# Patient Record
Sex: FEMALE | Race: BLACK OR AFRICAN AMERICAN | Employment: UNEMPLOYED | URBAN - METROPOLITAN AREA
[De-identification: names, ages, dates, MRNs, and addresses within clinical notes are randomized per-mention and may not be internally consistent; named-entity substitution may affect disease eponyms.]

---

## 2023-10-21 ENCOUNTER — HOSPITAL ENCOUNTER (EMERGENCY)
Age: 53
Discharge: HOME | End: 2023-10-21
Attending: STUDENT IN AN ORGANIZED HEALTH CARE EDUCATION/TRAINING PROGRAM
Payer: COMMERCIAL

## 2023-10-21 VITALS
OXYGEN SATURATION: 100 % | HEART RATE: 73 BPM | SYSTOLIC BLOOD PRESSURE: 108 MMHG | RESPIRATION RATE: 16 BRPM | DIASTOLIC BLOOD PRESSURE: 78 MMHG | WEIGHT: 220 LBS | BODY MASS INDEX: 33.34 KG/M2 | TEMPERATURE: 98 F | HEIGHT: 68 IN

## 2023-10-21 DIAGNOSIS — R11.0 NAUSEA: ICD-10-CM

## 2023-10-21 DIAGNOSIS — M54.41 ACUTE BILATERAL LOW BACK PAIN WITH RIGHT-SIDED SCIATICA: Primary | ICD-10-CM

## 2023-10-21 LAB
ALBUMIN SERPL-MCNC: 4 GM/DL (ref 3.4–5)
ALP BLD-CCNC: 90 IU/L (ref 40–129)
ALT SERPL-CCNC: 33 U/L (ref 10–40)
ANION GAP SERPL CALCULATED.3IONS-SCNC: 7 MMOL/L (ref 4–16)
AST SERPL-CCNC: 30 IU/L (ref 15–37)
BASOPHILS ABSOLUTE: 0 K/CU MM
BASOPHILS RELATIVE PERCENT: 0.5 % (ref 0–1)
BILIRUB SERPL-MCNC: 0.3 MG/DL (ref 0–1)
BUN SERPL-MCNC: 16 MG/DL (ref 6–23)
CALCIUM SERPL-MCNC: 9.2 MG/DL (ref 8.3–10.6)
CHLORIDE BLD-SCNC: 104 MMOL/L (ref 99–110)
CO2: 28 MMOL/L (ref 21–32)
CREAT SERPL-MCNC: 0.8 MG/DL (ref 0.6–1.1)
DIFFERENTIAL TYPE: ABNORMAL
EOSINOPHILS ABSOLUTE: 0.2 K/CU MM
EOSINOPHILS RELATIVE PERCENT: 4.7 % (ref 0–3)
GFR SERPL CREATININE-BSD FRML MDRD: >60 ML/MIN/1.73M2
GLUCOSE SERPL-MCNC: 92 MG/DL (ref 70–99)
HCT VFR BLD CALC: 38.2 % (ref 37–47)
HEMOGLOBIN: 11.7 GM/DL (ref 12.5–16)
IMMATURE NEUTROPHIL %: 0.2 % (ref 0–0.43)
LIPASE: 29 IU/L (ref 13–60)
LYMPHOCYTES ABSOLUTE: 1.4 K/CU MM
LYMPHOCYTES RELATIVE PERCENT: 33.3 % (ref 24–44)
MCH RBC QN AUTO: 26.8 PG (ref 27–31)
MCHC RBC AUTO-ENTMCNC: 30.6 % (ref 32–36)
MCV RBC AUTO: 87.6 FL (ref 78–100)
MONOCYTES ABSOLUTE: 0.5 K/CU MM
MONOCYTES RELATIVE PERCENT: 12.6 % (ref 0–4)
NUCLEATED RBC %: 0 %
PDW BLD-RTO: 15.4 % (ref 11.7–14.9)
PLATELET # BLD: 134 K/CU MM (ref 140–440)
POTASSIUM SERPL-SCNC: 4.3 MMOL/L (ref 3.5–5.1)
RBC # BLD: 4.36 M/CU MM (ref 4.2–5.4)
SEGMENTED NEUTROPHILS ABSOLUTE COUNT: 2.1 K/CU MM
SEGMENTED NEUTROPHILS RELATIVE PERCENT: 48.7 % (ref 36–66)
SODIUM BLD-SCNC: 139 MMOL/L (ref 135–145)
TOTAL IMMATURE NEUTOROPHIL: 0.01 K/CU MM
TOTAL NUCLEATED RBC: 0 K/CU MM
TOTAL PROTEIN: 6.7 GM/DL (ref 6.4–8.2)
WBC # BLD: 4.3 K/CU MM (ref 4–10.5)

## 2023-10-21 PROCEDURE — 83690 ASSAY OF LIPASE: CPT

## 2023-10-21 PROCEDURE — 80053 COMPREHEN METABOLIC PANEL: CPT

## 2023-10-21 PROCEDURE — 99284 EMERGENCY DEPT VISIT MOD MDM: CPT

## 2023-10-21 PROCEDURE — 6360000002 HC RX W HCPCS: Performed by: STUDENT IN AN ORGANIZED HEALTH CARE EDUCATION/TRAINING PROGRAM

## 2023-10-21 PROCEDURE — 85025 COMPLETE CBC W/AUTO DIFF WBC: CPT

## 2023-10-21 PROCEDURE — 96372 THER/PROPH/DIAG INJ SC/IM: CPT

## 2023-10-21 PROCEDURE — 6370000000 HC RX 637 (ALT 250 FOR IP): Performed by: STUDENT IN AN ORGANIZED HEALTH CARE EDUCATION/TRAINING PROGRAM

## 2023-10-21 RX ORDER — LIDOCAINE 4 G/G
1 PATCH TOPICAL DAILY
Qty: 15 EACH | Refills: 0 | Status: SHIPPED | OUTPATIENT
Start: 2023-10-21 | End: 2023-11-05

## 2023-10-21 RX ORDER — ACETAMINOPHEN 500 MG
1000 TABLET ORAL
Status: COMPLETED | OUTPATIENT
Start: 2023-10-21 | End: 2023-10-21

## 2023-10-21 RX ORDER — LIDOCAINE 4 G/G
1 PATCH TOPICAL DAILY
Status: DISCONTINUED | OUTPATIENT
Start: 2023-10-21 | End: 2023-10-21 | Stop reason: HOSPADM

## 2023-10-21 RX ORDER — KETOROLAC TROMETHAMINE 15 MG/ML
15 INJECTION, SOLUTION INTRAMUSCULAR; INTRAVENOUS ONCE
Status: COMPLETED | OUTPATIENT
Start: 2023-10-21 | End: 2023-10-21

## 2023-10-21 RX ORDER — IBUPROFEN 800 MG/1
800 TABLET ORAL EVERY 8 HOURS PRN
COMMUNITY
Start: 2023-09-12 | End: 2023-10-21

## 2023-10-21 RX ORDER — ONDANSETRON 4 MG/1
4 TABLET, ORALLY DISINTEGRATING ORAL ONCE
Status: COMPLETED | OUTPATIENT
Start: 2023-10-21 | End: 2023-10-21

## 2023-10-21 RX ADMIN — ACETAMINOPHEN 1000 MG: 500 TABLET ORAL at 15:23

## 2023-10-21 RX ADMIN — ONDANSETRON 4 MG: 4 TABLET, ORALLY DISINTEGRATING ORAL at 15:24

## 2023-10-21 RX ADMIN — KETOROLAC TROMETHAMINE 15 MG: 15 INJECTION, SOLUTION INTRAMUSCULAR; INTRAVENOUS at 15:24

## 2023-10-21 ASSESSMENT — PAIN DESCRIPTION - ORIENTATION
ORIENTATION: RIGHT;LEFT
ORIENTATION: RIGHT

## 2023-10-21 ASSESSMENT — PAIN SCALES - GENERAL
PAINLEVEL_OUTOF10: 9
PAINLEVEL_OUTOF10: 8

## 2023-10-21 ASSESSMENT — PAIN DESCRIPTION - LOCATION
LOCATION: LEG
LOCATION: KNEE

## 2023-10-21 NOTE — CARE COORDINATION
CM received consult from Dr Eunice Mills for patient in room #26. CM met with patient to determine CM needs. CM introduced self and CM role. CM utilized assistance from [employer] [id number]. Patient reports she presents to ER with request for physical therapy for right knee pain. Patient states she was seen at the [facility] and was referred to [facility] for physical therapy. CM advised patient that physical therapy is performed as an outpatient at the facility on [facility]. Patient states \"I see there are different departments here. Can't I just do it here? \" CM explained to patient that physical therapy is not performed in the ER and patient should contact [facility] [employer] [facility] at [phone]. CM advised patient of address and location of facility. Patient states she has attempted to call several times without success. CM advised patient of need to call during business hours (8am~5pm) and if call is not answered then leave a message. Patient verbalized understanding.

## 2023-10-21 NOTE — ED PROVIDER NOTES
Emergency Department Encounter    Patient: Anayeli Khan  MRN: [id number]  : 1970  Date of Evaluation: 10/21/2023  ED Provider:  Nya Elaine MD    Triage Chief Complaint:   Leg Pain (Right, states she was told to follow-up with sports med, but is unable to get through the office)    [place]:  Anayeli Khan is a 47 y.o. female hx of fibroids s/p surgery that presents with back and leg pain. Patient reports that after a fall she developed arthritis and has been having chronic back pain. However for the last 4 days has been worsening around her hips rating towards her legs, worse over the right leg. Today was worse than usual.  It is an 8 out of 10 on presentation. It is worse with movement. Patient has tried Vaseline and massages without improvement, see only took Advil yesterday. Patient denies any new falls, other type of trauma, runny nose, shore throat, chest pain, SOB, abdominal pain, n/v, vaginal bleeding or discharge, dysuria, hematuria, diarrhea, blood in the stool. Of note, patient also reports nausea without vomiting. ROS - see HPI    History reviewed. No pertinent past medical history. History reviewed. No pertinent surgical history. History reviewed. No pertinent family history.   Social History     Socioeconomic History    Marital status:      Spouse name: Not on file    Number of children: Not on file    Years of education: Not on file    Highest education level: Not on file   Occupational History    Not on file   Tobacco Use    Smoking status: Not on file    Smokeless tobacco: Not on file   Substance and Sexual Activity    Alcohol use: Not on file    Drug use: Not on file    Sexual activity: Not on file   Other Topics Concern    Not on file   Social History Narrative    Not on file     Social Determinants of Health     Financial Resource Strain: Not on file   Food Insecurity: Not on file   Transportation Needs: Not on file   Physical Activity: Not on file   Stress: Glom Filt Rate >60 >60 mL/min/1.73m2    Calcium 9.2 8.3 - 10.6 MG/DL    Total Protein 6.7 6.4 - 8.2 GM/DL    Albumin 4.0 3.4 - 5.0 GM/DL    Total Bilirubin 0.3 0.0 - 1.0 MG/DL    Alkaline Phosphatase 90 40 - 129 IU/L    ALT 33 10 - 40 U/L    AST 30 15 - 37 IU/L   Lipase   Result Value Ref Range    Lipase 29 13 - 60 IU/L      Radiographs (if obtained):  Radiologist's Report Reviewed:  No results found. MDM:  Assessment: 64-year-old female with past medical history of arthritis and fibroid presents with low hip pain rating towards her legs    Ddx including but not limited to: Musculoskeletal pain, sciatica, worsening arthritis    ED Course as of 10/21/23 1630   Sat Oct 21, 2023   1440 [place] #[id number] [AR]   1607 WBC: 4.3 [AR]   1608 Hemoglobin Quant(!): 11.7 [AR]   1618 Lipase: 29 [AR]   1618 CMP:    Sodium 139   Potassium 4.3   Chloride 104   CO2 28   Anion Gap 7   Glucose, Random 92   BUN,BUNPL 16   Creatinine 0.8   Est, Glom Filt Rate >60   CALCIUM, SERUM, 686167 9.2   Total Protein 6.7   Albumin 4.0   BILIRUBIN TOTAL 0.3   Alk Phos 90   ALT 33   AST 30 [AR]   1619 On reevaluation patient reports improvement of her symptoms. She feels comfortable going back home. [AR]      ED Course User Index  [AR] Lawrence Garcia MD       MDM: Stable vitals and presentation. Physical exam is reassuring. Patient given p.o. Zofran, p.o. Tylenol, IM Toradol, lidocaine patch for symptom control. Laboratory evaluation was done due to the nausea, which was unremarkable. Reevaluation patient reports improvement in her symptoms and feeling comfortable going back home. X-ray of her knee consider, however deferred due to lack of trauma, swelling, erythema during my evaluation.     History from : Patient and Family son    Limitations to history : Language [place]    Patient was given the following medications:  Medications   lidocaine 4 % external patch 1 patch (1 patch TransDERmal Patch Applied 10/21/23 2797)

## 2023-10-21 NOTE — ED NOTES
# [id number] used for assessment and updated on plan of care.  Case management at bedside     Norma Pickens  10/21/23 5297

## 2023-10-21 NOTE — DISCHARGE INSTRUCTIONS
-Take Tylenol, ibuprofen as needed for pain  -Take lidocaine patches as needed for back pain  -Follow-up with sports medicine  -Come back to the emergency department if you develop severe pain, severe nausea, if you are unable to walk, or if you are concerned

## 2023-10-31 ENCOUNTER — HOSPITAL ENCOUNTER (OUTPATIENT)
Dept: PHYSICAL THERAPY | Age: 53
Setting detail: THERAPIES SERIES
Discharge: HOME | End: 2023-10-31
Payer: COMMERCIAL

## 2023-10-31 PROCEDURE — 97110 THERAPEUTIC EXERCISES: CPT

## 2023-10-31 PROCEDURE — 97162 PT EVAL MOD COMPLEX 30 MIN: CPT

## 2023-10-31 ASSESSMENT — PAIN SCALES - GENERAL: PAINLEVEL_OUTOF10: 7

## 2023-10-31 NOTE — PLAN OF CARE
Outpatient Physical Therapy           [place]           [x] Phone: [phone]   Fax: [phone]  [place]           [] Phone: [phone]   Fax: [phone]     To: Lexie Lees MD     From: Argelia Real, PT, DPT, Cert. DN      Patient: Catherine Davis       : 1970  Diagnosis: primary osteoarthritis of both knees   Treatment Diagnosis: bilateral knee pain  Date: 10/31/2023    Physical Therapy Certification/Re-Certification Form  Dear Dr. Katlyn Vázquez,   The following patient has been evaluated for physical therapy services and for therapy to continue, insurance requires physician review of the treatment plan initially and every 90 days. Please review the attached evaluation and/or summary of the patient's plan of care, and verify that you agree therapy should continue by signing the attached document and sending it back to our office. Assessment:    Assessment: pt is a 46 yo female that presents to therapy with complaints of bilateral knee pain after a fall while she was attempting to sit in a chair. pt demo impaired BLE strength/ROM, stair negotiation, activity tolerance, functional mobility and increased pain. pt is [place] speaking and requires , she speaks some english and some Romansh. Pt would benefit from continued skilled physical therapy to address impairments and limitations, progress toward goal completion, promote independence with ADLs, and prevent further injury. Patient agrees with established plan of care and assisted in the development of their short term and long term goals. Patient had no adverse reaction with initial treatment and there are no barriers to learning. Learning preferences include demonstration, practice, and handouts. Patient expressed understanding of HEP and appears to be motivated to participate in an active PT program including compliance with HEP expectations.       Plan of Care/Treatment to date:  [x] Therapeutic Exercise  [x]

## 2023-10-31 NOTE — PROGRESS NOTES
Physical Therapy: Initial Evaluation    Patient: Brenden Méndez (49 y.o. female)   Examination Date:   Plan of Care Certification Period: 10/31/2023 to        :  1970 ;    Confirmed: Yes MRN: [id number]  CSN: [id number]   Insurance: Payor: [employer] / Plan: [employer] / Product Type: *No Product type* /   Insurance ID: [id number] - (Medicaid Managed) Secondary Insurance (if applicable):    Referring Physician: Yani Ibrahim MD     PCP: Zoila Liu MD Visits to Date/Visits Approved:   /      No Show/Cancelled Appts:   /       Medical Diagnosis: primary osteoarthritis of both knees   Treatment Diagnosis: bilateral knee pain     PERTINENT MEDICAL HISTORY   Patient Assessed for Rehabilitation Services: Yes       Medical History: Chart Reviewed: Yes No past medical history on file. Surgical History: No past surgical history on file. Medications:   Current Outpatient Medications:     lidocaine 4 % external patch, Place 1 patch onto the skin daily for 15 days, Disp: 15 each, Rfl: 0  Allergies: Other      SUBJECTIVE EXAMINATION      ,           Subjective History:    Subjective: Pt reports that she is currently in 7/10 pain in bilateral knees, she notes that the other day she had to go to the ER because it was swollen. She notes that she recently fell when she was going to sit in a chair and the chair went with her and broke. She notes that she does have tingling in her knees sometimes. She notes that she is having issues going upstairs and her shower and bed are upstairs. She notes that the R leg is worse than the L but that they both hurt.   Additional Pertinent Hx (if applicable): OA, HTN, asthma          Learning/Language: Learning  Does the patient/guardian have any barriers to learning?: Language  What is the preferred language of the patient/guardian?: Other (comment) (creole)  How does the patient/guardian prefer to learn new concepts?: Listening,

## 2023-10-31 NOTE — FLOWSHEET NOTE
Outpatient Physical Therapy  [place]           [x] Phone: [phone]   Fax: [phone]  [place]           [] Phone: [phone]   Fax: [phone]        Physical Therapy Daily Treatment Note  Date:  10/31/2023    Patient Name:  Delfina Nino    :  1970  MRN: [id number]  Restrictions/Precautions: Restrictions/Precautions: Fall Risk        Diagnosis:   primary osteoarthritis of both knees   Date of Injury/Surgery:   Treatment Diagnosis:  bilateral knee pain  Insurance/Certification information: [facility]- 30 visits  Referring Physician:  Jany Islas MD     PCP: Fernandez Jonas MD  Next Doctor Visit:    Plan of care signed (Y/N):  sent 10/31  Outcome Measure:   Visit# / total visits:   1/10  Pain level: 7/10   Goals:     Patient goals: reduce pain  Short term goals  Time Frame for Short term goals: refer to [facility]    Long Term Goals  Time Frame for Long Term Goals: 10 visits  Pt will report overall improvement in condition by 50% or more  pt will improve bilateral hip flexion strength to 4/5 for improved stair negotiation  pt will improve bilateral knee flexion AROM to 110 deg for improved stair negotiation  pt will report no pain with ascending/descending stairs         Summary of Evaluation:  Assessment: pt is a 48 yo female that presents to therapy with complaints of bilateral knee pain after a fall while she was attempting to sit in a chair. pt demo impaired BLE strength/ROM, stair negotiation, activity tolerance, functional mobility and increased pain. pt is [place] speaking and requires , she speaks some english and some Citizen of [place]. Pt would benefit from continued skilled physical therapy to address impairments and limitations, progress toward goal completion, promote independence with ADLs, and prevent further injury. Subjective:  See eval         Any changes in Ambulatory Summary Sheet?   None        Objective:  See eval           Exercises: (No more than 4

## 2023-11-04 ENCOUNTER — HOSPITAL ENCOUNTER (OUTPATIENT)
Dept: PHYSICAL THERAPY | Age: 53
Setting detail: THERAPIES SERIES
Discharge: HOME | End: 2023-11-04
Payer: COMMERCIAL

## 2023-11-04 PROCEDURE — 97016 VASOPNEUMATIC DEVICE THERAPY: CPT | Performed by: PHYSICAL THERAPY ASSISTANT

## 2023-11-04 PROCEDURE — 97110 THERAPEUTIC EXERCISES: CPT | Performed by: PHYSICAL THERAPY ASSISTANT

## 2023-11-04 NOTE — FLOWSHEET NOTE
Outpatient Physical Therapy  [place]           [x] Phone: [phone]   Fax: [phone]  [place]           [] Phone: [phone]   Fax: [phone]        Physical Therapy Daily Treatment Note  Date:  2023    Patient Name:  Ulises Galdamez    :  1970  MRN: [id number]  Restrictions/Precautions: Restrictions/Precautions: Fall Risk      used: [place] Language. Diagnosis:   primary osteoarthritis of both knees   Date of Injury/Surgery:   Treatment Diagnosis:  bilateral knee pain  Insurance/Certification information: [facility]- 30 visits  Referring Physician:  Akhil Mcmullen MD     PCP: Katarzyna Richardson MD  Next Doctor Visit:    Plan of care signed (Y/N):  sent 10/31  Outcome Measure:   Visit# / total visits:   2/10  Pain level: 7/10   Goals:     Patient goals: reduce pain  Short term goals  Time Frame for Short term goals: refer to [facility]    Long Term Goals  Time Frame for Long Term Goals: 10 visits  Pt will report overall improvement in condition by 50% or more  pt will improve bilateral hip flexion strength to 4/5 for improved stair negotiation  pt will improve bilateral knee flexion AROM to 110 deg for improved stair negotiation  pt will report no pain with ascending/descending stairs         Summary of Evaluation:  Assessment: pt is a 46 yo female that presents to therapy with complaints of bilateral knee pain after a fall while she was attempting to sit in a chair. pt demo impaired BLE strength/ROM, stair negotiation, activity tolerance, functional mobility and increased pain. pt is [place] speaking and requires , she speaks some english and some Czech. Pt would benefit from continued skilled physical therapy to address impairments and limitations, progress toward goal completion, promote independence with ADLs, and prevent further injury. Subjective:  Pt stated she has trouble going up/down steps at home.   The right knee is more painful than

## 2023-11-08 ENCOUNTER — HOSPITAL ENCOUNTER (OUTPATIENT)
Age: 53
Setting detail: SPECIMEN
Discharge: HOME | End: 2023-11-08
Payer: COMMERCIAL

## 2023-11-08 ENCOUNTER — OFFICE VISIT (OUTPATIENT)
Dept: OBGYN | Age: 53
End: 2023-11-08
Payer: COMMERCIAL

## 2023-11-08 VITALS
WEIGHT: 241 LBS | HEART RATE: 68 BPM | BODY MASS INDEX: 36.53 KG/M2 | DIASTOLIC BLOOD PRESSURE: 79 MMHG | HEIGHT: 68 IN | SYSTOLIC BLOOD PRESSURE: 117 MMHG

## 2023-11-08 DIAGNOSIS — R10.2 PELVIC PAIN: ICD-10-CM

## 2023-11-08 DIAGNOSIS — D21.9 FIBROIDS: ICD-10-CM

## 2023-11-08 DIAGNOSIS — Z01.419 ENCOUNTER FOR WELL WOMAN EXAM WITH ROUTINE GYNECOLOGICAL EXAM: Primary | ICD-10-CM

## 2023-11-08 PROCEDURE — 87801 DETECT AGNT MULT DNA AMPLI: CPT

## 2023-11-08 PROCEDURE — 87624 HPV HI-RISK TYP POOLED RSLT: CPT

## 2023-11-08 PROCEDURE — 88142 CYTOPATH C/V THIN LAYER: CPT

## 2023-11-08 PROCEDURE — 99386 PREV VISIT NEW AGE 40-64: CPT | Performed by: OBSTETRICS & GYNECOLOGY

## 2023-11-08 RX ORDER — MONTELUKAST SODIUM 10 MG/1
10 TABLET ORAL NIGHTLY
COMMUNITY

## 2023-11-08 RX ORDER — HYDROCHLOROTHIAZIDE 25 MG/1
25 TABLET ORAL DAILY
COMMUNITY

## 2023-11-08 RX ORDER — AMLODIPINE BESYLATE 5 MG/1
5 TABLET ORAL DAILY
COMMUNITY

## 2023-11-08 RX ORDER — ALBUTEROL SULFATE 0.63 MG/3ML
1 SOLUTION RESPIRATORY (INHALATION) EVERY 6 HOURS PRN
COMMUNITY

## 2023-11-08 RX ORDER — IBUPROFEN 800 MG/1
800 TABLET ORAL EVERY 6 HOURS PRN
COMMUNITY

## 2023-11-08 SDOH — ECONOMIC STABILITY: HOUSING INSECURITY
IN THE LAST 12 MONTHS, WAS THERE A TIME WHEN YOU DID NOT HAVE A STEADY PLACE TO SLEEP OR SLEPT IN A SHELTER (INCLUDING NOW)?: NO

## 2023-11-08 SDOH — ECONOMIC STABILITY: FOOD INSECURITY: WITHIN THE PAST 12 MONTHS, YOU WORRIED THAT YOUR FOOD WOULD RUN OUT BEFORE YOU GOT MONEY TO BUY MORE.: NEVER TRUE

## 2023-11-08 SDOH — ECONOMIC STABILITY: FOOD INSECURITY: WITHIN THE PAST 12 MONTHS, THE FOOD YOU BOUGHT JUST DIDN'T LAST AND YOU DIDN'T HAVE MONEY TO GET MORE.: NEVER TRUE

## 2023-11-08 SDOH — ECONOMIC STABILITY: INCOME INSECURITY: HOW HARD IS IT FOR YOU TO PAY FOR THE VERY BASICS LIKE FOOD, HOUSING, MEDICAL CARE, AND HEATING?: NOT VERY HARD

## 2023-11-08 ASSESSMENT — PATIENT HEALTH QUESTIONNAIRE - PHQ9
1. LITTLE INTEREST OR PLEASURE IN DOING THINGS: 0
SUM OF ALL RESPONSES TO PHQ QUESTIONS 1-9: 0
2. FEELING DOWN, DEPRESSED OR HOPELESS: 0
SUM OF ALL RESPONSES TO PHQ QUESTIONS 1-9: 0
SUM OF ALL RESPONSES TO PHQ9 QUESTIONS 1 & 2: 0

## 2023-11-08 NOTE — PROGRESS NOTES
23    Zaid Vargas  1970    Chief Complaint   Patient presents with    New Patient     Pt here for annual, is not sexually active, irregular menses, LMP-2023, bc-none, pap--fl, nzvxi-4452-ov, dexa-never, colonoscopy-2022-normal-fl.  # [id number], [id number]. Pelvic Pain     Pt c/o pelvic pain x 4 months, mild-severe, stabbing, sharp,radiates into lower back. Pt states had ct done 2023 @ [place] showing  There is subtle heterogeneous enhancement along the uterine fundus, nonspecific.  Correlation with a pelvic ultrasound would be beneficial.    There is a 1.6 cm low-density lesion within the right adnexa, favored represent a tiny ovarian cyst.            Angélica Morgan is a 47 y.o. female who presents today for evaluation of annual examination and pelvic pain, fibroids    Past Medical History:   Diagnosis Date    Arthritis     Asthma     Dyspareunia in female     Fibroids     Hypertension     Hypothyroidism     Lower back pain     Obesity     Pelvic pain     Pelvic pain     Seasonal allergies        Past Surgical History:   Procedure Laterality Date     SECTION      COLONOSCOPY      ESOPHAGOGASTRODUODENOSCOPY      UTERINE FIBROID SURGERY  2023       Social History     Tobacco Use    Smoking status: Never    Smokeless tobacco: Never   Vaping Use    Vaping Use: Never used   Substance Use Topics    Alcohol use: Never    Drug use: Never       Family History   Problem Relation Age of Onset    Arthritis Mother        Current Outpatient Medications   Medication Sig Dispense Refill    montelukast (SINGULAIR) 10 MG tablet Take 1 tablet by mouth nightly      hydroCHLOROthiazide (HYDRODIURIL) 25 MG tablet Take 1 tablet by mouth daily      amLODIPine (NORVASC) 5 MG tablet Take 1 tablet by mouth daily      ibuprofen (ADVIL;MOTRIN) 800 MG tablet Take 1 tablet by mouth every 6 hours as needed for Pain      albuterol (ACCUNEB) 0.63 MG/3ML nebulizer solution Take 3 mLs by

## 2023-11-11 LAB
C TRACH RRNA SPEC QL NAA+PROBE: NEGATIVE
HPV HIGH RISK: DETECTED
HPV, GENOTYPE 16: NOT DETECTED
HPV, GENOTYPE 18: NOT DETECTED
N GONORRHOEA RRNA SPEC QL NAA+PROBE: NEGATIVE

## 2023-11-15 ENCOUNTER — HOSPITAL ENCOUNTER (OUTPATIENT)
Dept: WOMENS IMAGING | Age: 53
Discharge: HOME | End: 2023-11-15
Payer: COMMERCIAL

## 2023-11-15 DIAGNOSIS — Z12.31 SCREENING MAMMOGRAM FOR BREAST CANCER: ICD-10-CM

## 2023-11-15 PROCEDURE — 77063 BREAST TOMOSYNTHESIS BI: CPT

## 2023-11-18 ENCOUNTER — HOSPITAL ENCOUNTER (OUTPATIENT)
Dept: PHYSICAL THERAPY | Age: 53
Setting detail: THERAPIES SERIES
Discharge: HOME | End: 2023-11-18
Payer: COMMERCIAL

## 2023-11-18 PROCEDURE — 97016 VASOPNEUMATIC DEVICE THERAPY: CPT

## 2023-11-18 PROCEDURE — 97110 THERAPEUTIC EXERCISES: CPT

## 2023-11-18 PROCEDURE — 97140 MANUAL THERAPY 1/> REGIONS: CPT

## 2023-11-18 NOTE — FLOWSHEET NOTE
pain and swelling management needed, with the strengthening of bilateral LE. Ended session with [facility] to further assist with pain management and ongoing swelling.   End pain: 5/10    Plan for Next Session: Specific Instructions for Next Treatment: BLE strength/ROM, STS, stair progression    Time In / Time Out:    7084/7250    Timed Code/Total Treatment Minutes:    MAN X 30' X 2;   TE X 21' X 1;   GR X 15' X 1     Next Progress Note due:  10th visit     Plan of Care Interventions:  [x] Therapeutic Exercise  [x] Modalities:  [x] Therapeutic Activity     [] Ultrasound  [] Estim  [] Gait Training      [] Cervical Traction [] Lumbar Traction  [x] Neuromuscular Re-education    [] Cold/hotpack [] Iontophoresis   [x] Instruction in HEP      [x] Vasopneumatic   [] Dry Needling    [x] Manual Therapy               [] Aquatic Therapy            Electronically signed by: Max Saleh II, PTA [id number]          11/18/2023, 7:05 AM

## 2023-11-25 ENCOUNTER — HOSPITAL ENCOUNTER (OUTPATIENT)
Dept: PHYSICAL THERAPY | Age: 53
Setting detail: THERAPIES SERIES
Discharge: HOME | End: 2023-11-25
Payer: COMMERCIAL

## 2023-11-25 PROCEDURE — 97110 THERAPEUTIC EXERCISES: CPT

## 2023-11-25 PROCEDURE — 97016 VASOPNEUMATIC DEVICE THERAPY: CPT

## 2023-11-25 NOTE — FLOWSHEET NOTE
Therapeutic Exercise  [x] Modalities:  [x] Therapeutic Activity     [] Ultrasound  [] Estim  [] Gait Training      [] Cervical Traction [] Lumbar Traction  [x] Neuromuscular Re-education    [] Cold/hotpack [] Iontophoresis   [x] Instruction in HEP      [x] Vasopneumatic   [] Dry Needling    [x] Manual Therapy               [] Aquatic Therapy            Electronically signed by:Ofe Webber PTA, CLT 11/25/23 9:03 AM

## 2023-11-30 ENCOUNTER — OFFICE VISIT (OUTPATIENT)
Dept: OBGYN | Age: 53
End: 2023-11-30
Payer: COMMERCIAL

## 2023-11-30 VITALS
BODY MASS INDEX: 36.98 KG/M2 | DIASTOLIC BLOOD PRESSURE: 84 MMHG | SYSTOLIC BLOOD PRESSURE: 129 MMHG | WEIGHT: 244 LBS | HEIGHT: 68 IN

## 2023-11-30 DIAGNOSIS — N92.6 IRREGULAR MENSES: Primary | ICD-10-CM

## 2023-11-30 DIAGNOSIS — R10.2 PELVIC PAIN: ICD-10-CM

## 2023-11-30 PROCEDURE — 99213 OFFICE O/P EST LOW 20 MIN: CPT | Performed by: OBSTETRICS & GYNECOLOGY

## 2023-11-30 PROCEDURE — 36415 COLL VENOUS BLD VENIPUNCTURE: CPT | Performed by: OBSTETRICS & GYNECOLOGY

## 2023-12-01 LAB — FSH SERPL-ACNC: 21.9 MIU/ML

## 2023-12-01 NOTE — PROGRESS NOTES
23    Zaid Vargas  1970    Chief Complaint   Patient presents with    Follow-up     Pt here to discuss ultrasound results d/t pelvic pain. Izabel Reardon is a 47 y.o. female who presents today for evaluation of pelvic pain. She states the pelvic pain has resolved since her last visit. Ultrasound reviewed and was normal.  She is questioning whether she has the ability to get pregnant. We discussed her age and the likelihood of pregnancy associated with it. Past Medical History:   Diagnosis Date    Arthritis     Asthma     Dyspareunia in female     Fibroids     Hypertension     Hypothyroidism     Lower back pain     Obesity     Pelvic pain     Pelvic pain     Seasonal allergies        Past Surgical History:   Procedure Laterality Date     SECTION      COLONOSCOPY      ESOPHAGOGASTRODUODENOSCOPY      UTERINE FIBROID SURGERY  2023       Social History     Tobacco Use    Smoking status: Never    Smokeless tobacco: Never   Vaping Use    Vaping Use: Never used   Substance Use Topics    Alcohol use: Never    Drug use: Never       Family History   Problem Relation Age of Onset    Arthritis Mother     Breast Cancer Neg Hx     Ovarian Cancer Neg Hx        Current Outpatient Medications   Medication Sig Dispense Refill    montelukast (SINGULAIR) 10 MG tablet Take 1 tablet by mouth nightly      hydroCHLOROthiazide (HYDRODIURIL) 25 MG tablet Take 1 tablet by mouth daily      amLODIPine (NORVASC) 5 MG tablet Take 1 tablet by mouth daily      ibuprofen (ADVIL;MOTRIN) 800 MG tablet Take 1 tablet by mouth every 6 hours as needed for Pain      albuterol (ACCUNEB) 0.63 MG/3ML nebulizer solution Take 3 mLs by nebulization every 6 hours as needed for Wheezing       No current facility-administered medications for this visit.        Allergies   Allergen Reactions    Hydrocortisone     Other      Patient states she has allergies but cannot remember the name             There is no

## 2023-12-02 ENCOUNTER — HOSPITAL ENCOUNTER (OUTPATIENT)
Dept: PHYSICAL THERAPY | Age: 53
Setting detail: THERAPIES SERIES
Discharge: HOME | End: 2023-12-02
Payer: COMMERCIAL

## 2023-12-02 PROCEDURE — 97110 THERAPEUTIC EXERCISES: CPT | Performed by: PHYSICAL THERAPY ASSISTANT

## 2023-12-02 PROCEDURE — 97016 VASOPNEUMATIC DEVICE THERAPY: CPT | Performed by: PHYSICAL THERAPY ASSISTANT

## 2023-12-09 ENCOUNTER — HOSPITAL ENCOUNTER (OUTPATIENT)
Dept: PHYSICAL THERAPY | Age: 53
Setting detail: THERAPIES SERIES
Discharge: HOME | End: 2023-12-09
Payer: COMMERCIAL

## 2023-12-09 PROCEDURE — 97110 THERAPEUTIC EXERCISES: CPT

## 2023-12-09 PROCEDURE — 97016 VASOPNEUMATIC DEVICE THERAPY: CPT

## 2023-12-09 PROCEDURE — 97530 THERAPEUTIC ACTIVITIES: CPT

## 2023-12-09 NOTE — FLOWSHEET NOTE
Outpatient Physical Therapy  [place]           [x] Phone: [phone]   Fax: [phone]  [facility]           [] Phone: [phone]   Fax: [phone]        Physical Therapy Daily Treatment Note  Date:  2023    Patient Name:  Anh Matthew    :  1970  MRN: [id number]  Restrictions/Precautions: Restrictions/Precautions: Fall Risk      used: [place] Language. Diagnosis:   primary osteoarthritis of both knees   Date of Injury/Surgery:   Treatment Diagnosis:  bilateral knee pain  Insurance/Certification information: [facility]- 30 visits  Referring Physician:  Karri Martínez MD     PCP: Edgar Tyson MD  Next Doctor Visit:    Plan of care signed (Y/N):  sent 10/31  Outcome Measure:   Visit# / total visits:   6/10  Pain level: 7/10    Goals:     Patient goals: reduce pain  MET   Short term goals  Time Frame for Short term goals: refer to [facility]    Long Term Goals  Time Frame for Long Term Goals: 10 visits  Pt will report overall improvement in condition by 50% or more MET   pt will improve bilateral hip flexion strength to 4/5 for improved stair negotiation MET   pt will improve bilateral knee flexion AROM to 110 deg for improved stair negotiation MET   pt will report no pain with ascending/descending stairs NOT MET          Summary of Evaluation:  Pt is a 48 yo female that presents to therapy with complaints of bilateral knee pain after a fall while she was attempting to sit in a chair. Pt demo's impaired BLE strength/ROM, stair negotiation, activity tolerance, functional mobility and increased pain. Pt is [place] speaking and requires , she speaks some english and some Bel[place]ian. Pt would benefit from continued skilled physical therapy to address impairments and limitations, progress toward goal completion, promote independence with ADLs, and prevent further injury.       Subjective:  pt reports that she wants to continue with therapy but

## 2023-12-09 NOTE — DISCHARGE SUMMARY
Outpatient Physical Therapy           [place]           [x] Phone: [phone]   Fax: [phone]  [facility]           [] Phone: [phone]   Fax: [phone]      To: Thi Camargo MD     From: Alon Diaz, PT, DPT, Cert. DN      Patient: Francisco Cr                    : 1970  Diagnosis: primary osteoarthritis of both knees   Treatment Diagnosis: bilateral knee pain  Date: 2023  []  Progress Note                [x]  Discharge Note    Evaluation Date:  10/31/23   Total Visits to date:   6 Cancels/No-shows to date:  0    Subjective:   pt reports that she wants to continue with therapy but wants to focus on her back instead of her knee pain- pt informed that she would need a referral for the back. Pt reports that her overall bilateral knee pain has improved since starting therapy. She reports that she would be better if she came more than once a week but she is unable to and only able to make it on . She feels that she has improved by 50% overall since starting therapy. She reports that she still has trouble and pain with ascending/descending stairs. She reports that the RLE was worse so she relied on LLE more and now the LLE is giving her problems. Objective/Significant Findings At Last Visit/Comments:    Bilateral hip flexion strength: 5/5   R knee flexion AROM: 115 deg  L knee flexion AROM: 110 deg        Assessment:   Pt has shown good progress since therapy start regarding improved BLE ROM/strength, activity tolerance, functional mobility, and pain. Pt has met most of her  goals at this time and is no longer having any major limitations outside of therapy. PT educated pt on continuation of HEP after discharge for max benefits and reduced risk for future decline. Pt discharged this date.        Goal Status:  [x] Achieved [] Partially Achieved  [x] Not Achieved   Patient goals: reduce pain  MET   Short term goals  Time Frame for Short term goals: refer to

## 2024-01-22 ENCOUNTER — HOSPITAL ENCOUNTER (OUTPATIENT)
Dept: PHYSICAL THERAPY | Age: 54
Setting detail: THERAPIES SERIES
Discharge: HOME | End: 2024-01-22
Payer: COMMERCIAL

## 2024-01-22 NOTE — FLOWSHEET NOTE
Physical Therapy  Cancellation/No-show Note  Patient Name:  Zaid Vargas  :  1970   Date:  2024  Cancelled visits to date: 1  No-shows to date: 0    For today's appointment patient:  [x]  Cancelled  []  Rescheduled appointment  []  No-show     Reason given by patient:  []  Patient ill  []  Conflicting appointment  []  No transportation    [x]  Conflict with work  []  No reason given  []  Other:     Comments:  pt came to appt late, PT got her into an exam room and started asking subjective questions and pt related that she had to work at 10:20 and needed to leave. Pt was rescheduled.     Electronically signed by:  Edwige Cates, PT, DPT, Cert. DN

## 2024-01-27 ENCOUNTER — HOSPITAL ENCOUNTER (OUTPATIENT)
Dept: PHYSICAL THERAPY | Age: 54
Setting detail: THERAPIES SERIES
Discharge: HOME | End: 2024-01-27
Payer: COMMERCIAL

## 2024-01-27 PROCEDURE — 97110 THERAPEUTIC EXERCISES: CPT

## 2024-01-27 PROCEDURE — 97161 PT EVAL LOW COMPLEX 20 MIN: CPT

## 2024-01-27 ASSESSMENT — PAIN DESCRIPTION - LOCATION: LOCATION: BACK;LEG

## 2024-01-27 ASSESSMENT — PAIN DESCRIPTION - PAIN TYPE: TYPE: CHRONIC PAIN

## 2024-01-27 NOTE — PROGRESS NOTES
Physical Therapy: Initial Evaluation    Patient: Zaid Vargas (53 y.o. female)   Examination Date: 2024  Plan of Care Certification Period: 2024 to        :  1970 ;    Confirmed: Yes MRN: [id number]  CSN: [id number]   Insurance: Payor: [employer] [place] / Plan: [employer] [place] / Product Type: *No Product type* /   Insurance ID: [id number] - (Medicaid Managed) Secondary Insurance (if applicable):    Referring Physician: Merlyn Roland MD     PCP: Jessica Fuentes MD Visits to Date/Visits Approved:   /      No Show/Cancelled Appts:   /       Medical Diagnosis: Lumbago with sciatica, left side [M54.42] hronic LBP , B sciatica  Treatment Diagnosis: chronic LBP, occasional sciatic symptoms, core weakness, lumbar fascial and muscle tightness     PERTINENT MEDICAL HISTORY   Patient Assessed for Rehabilitation Services: Yes  Self reported health status:: Good    Medical History: Chart Reviewed: Yes   Past Medical History:   Diagnosis Date    Arthritis     Asthma     Dyspareunia in female     Fibroids     Hypertension     Hypothyroidism     Lower back pain     Obesity     Pelvic pain     Pelvic pain     Seasonal allergies      Surgical History:   Past Surgical History:   Procedure Laterality Date     SECTION      COLONOSCOPY      ESOPHAGOGASTRODUODENOSCOPY      UTERINE FIBROID SURGERY  2023       Medications:   Current Outpatient Medications:     montelukast (SINGULAIR) 10 MG tablet, Take 1 tablet by mouth nightly, Disp: , Rfl:     hydroCHLOROthiazide (HYDRODIURIL) 25 MG tablet, Take 1 tablet by mouth daily, Disp: , Rfl:     amLODIPine (NORVASC) 5 MG tablet, Take 1 tablet by mouth daily, Disp: , Rfl:     ibuprofen (ADVIL;MOTRIN) 800 MG tablet, Take 1 tablet by mouth every 6 hours as needed for Pain, Disp: , Rfl:     albuterol (ACCUNEB) 0.63 MG/3ML nebulizer solution, Take 3 mLs by nebulization every 6 hours as needed for Wheezing, Disp: , Rfl:   Allergies:

## 2024-01-27 NOTE — PLAN OF CARE
Electrical Stimulation  [] Gait Training      [] Cervical Traction [] Lumbar Traction  [x] Neuromuscular Re-education    [] Cold/hotpack [] Iontophoresis   [x] Instruction in HEP      [] Vasopneumatic    [] Dry Needling  [x] Manual Therapy               [] Aquatic Therapy       Other:          Frequency/Duration: pt request only Saturdays due to work schedule  # Days per week: [x] 1 day # Weeks: [] 1 week [] 5 weeks     [] 2 days   [] 2 weeks [x] 6 weeks     [] 3 days   [] 3 weeks [] 7 weeks     [] 4 days   [] 4 weeks [x] 8 weeks         [] 9 weeks [] 10 weeks         [] 11 weeks [] 12 weeks    Rehab Potential/Progress: [] Excellent [x] Good [] Fair  [] Poor     Goals:    Patient goals: lessen back pain with ADL , working as home health aid, walking, standing and lifting  Short term goals  Time Frame for Short term goals: 4 weeks  1. ind with HEP for core and hip strenght and flexibility  2. 505 less tenderness to palp in the lower lumbar fascia area  3. reports avg pain inthe back  4/10 or less with home health aid work and ALDs  4. demo good Ta contraction     Long Term Goals  Time Frame for Long Term Goals: 8 weeks  1. avg pain nthe lumbar area 2/10 with typicla activityi including home health aid work, ALDs, and wlaking and standing  2. lumbar flex and ext ROM WFL wiith no increase in her LBP above 2/10  3.  MMT 4+/5 B hip nad knees for greater ability to complete stairs and standing activity  4. pt with 75% less back pain and tightness overall and  with palpation           Electronically signed by:  Js Ragland, PT, DPT, 1/27/2024, 9:49 AM        If you have any questions or concerns, please don't hesitate to call.  Thank you for your referral.      Physician Signature:________________________________Date:_________ TIME: _____  By signing above, therapist’s plan is approved by physician

## 2024-01-27 NOTE — FLOWSHEET NOTE
degeneration, muscle tightness and limited posture. she owen does not follow a directional preference.  She did have greater pain with most TE today. She is TTP in the lower lumbar spine and appears with muscle tightness in the same area.  She also recently had a cyst and abdominal surgery and is limited by some abdominal pain and B knee pain that are unrelated. she will likely require some LIZETH, manual, therapy to better tolerate the TE as the sessions progress.   Pt would benefit from skilled therapy interventions as needed to address listed impairments, progress toward goal completion and improve ADL/IADL status.  PT also warranted to reduce risk for further injury or decline.        Subjective:  See eval         Any changes in Ambulatory Summary Sheet?  None        Objective:  See eval           Exercises: (No more than 4 columns)  chronic LBP , B sciatica  Exercise/Equipment 1# 1/27/24 Date Date           WARM UP         nustep            TABLE      TA contraction *10     Hook lying rotation *10     HS stretch 2*30 sec                    STANDING      Hip ext *10     Paloff press                                         PROPRIOCEPTION      Seated SB rocks      Seated SB march      Seated resisted core rotation with-band                  MODALITIES                      Other Therapeutic Activities/Education:  Patient received education on their current pathology and how their condition effects them with their functional activities. Patient understood discussion and questions were answered. Patient understands their activity limitations and understands rational for treatment progression.       Home Exercise Program:  Pt completed 1 set of HEP in clinic with instruction per HEP sheet dated today. Appeared to understand program. Any questions clarified.      Access Code: [email or web address]  URL: [email or web address]  Date: 01/27/2024  Prepared by: Js Ragland    Exercises  - Supine Lower Trunk Rotation  - 2 x daily -

## 2024-02-03 ENCOUNTER — HOSPITAL ENCOUNTER (OUTPATIENT)
Dept: PHYSICAL THERAPY | Age: 54
Setting detail: THERAPIES SERIES
Discharge: HOME | End: 2024-02-03
Payer: COMMERCIAL

## 2024-02-03 PROCEDURE — 97110 THERAPEUTIC EXERCISES: CPT | Performed by: PHYSICAL THERAPY ASSISTANT

## 2024-02-03 NOTE — FLOWSHEET NOTE
Outpatient Physical Therapy  [place]           [x] Phone: [phone]   Fax: [phone]  [place]           [] Phone: [phone]   Fax: [phone]        Physical Therapy Daily Treatment Note  Date:  2/3/2024    Patient Name:  Zaid Vargas    :  1970  MRN: [id number]  Restrictions/Precautions: Restrictions/Precautions: Fall Risk        Grenadian Creole Speaking using interpretor     Diagnosis:   Lumbago with sciatica, left side [M54.42]    Date of Injury/Surgery:   Treatment Diagnosis:     Insurance/Certification information:    Referring Physician:  Merlyn Roland MD     PCP: Jessica Fuentes MD  Next Doctor Visit:    Plan of care signed (Y/N):   no  Outcome Measure:  incomplete language barrier  Visit# / total visits:  -10  Pain level: 7  /10 currntly    Goals:     Patient goals: lessen back pain with ADL , working as home health aid, walking, standing and lifting  Short term goals  Time Frame for Short term goals: 4 weeks  1. ind with HEP for core and hip strenght and flexibility  2. 505 less tenderness to palp in the lower lumbar fascia area  3. reports avg pain inthe back  4/10 or less with home health aid work and ALDs  4. demo good Ta contraction  Long Term Goals  Time Frame for Long Term Goals: 8 weeks  1. avg pain nthe lumbar area 2/10 with typicla activityi including home health aid work, ALDs, and wlaking and standing  2. lumbar flex and ext ROM WFL wiith no increase in her LBP above 2/10  3.  MMT 4+/5 B hip nad knees for greater ability to complete stairs and standing activity  4. pt with 75% less back pain and tightness overall and  with palpation             Summary of Evaluation:   Summary of Evaluation:  Assessment: Pt appears today Gren[place] Creole and will require . She has complaints of LBP that is chronic likely related to degeneration, muscle tightness and limited posture. she owen does not follow a directional preference.  She did have greater

## 2024-02-10 ENCOUNTER — HOSPITAL ENCOUNTER (OUTPATIENT)
Dept: PHYSICAL THERAPY | Age: 54
Setting detail: THERAPIES SERIES
Discharge: HOME | End: 2024-02-10
Payer: COMMERCIAL

## 2024-02-10 PROCEDURE — 97140 MANUAL THERAPY 1/> REGIONS: CPT

## 2024-02-10 PROCEDURE — 97112 NEUROMUSCULAR REEDUCATION: CPT

## 2024-02-10 PROCEDURE — 97110 THERAPEUTIC EXERCISES: CPT

## 2024-02-10 NOTE — FLOWSHEET NOTE
Outpatient Physical Therapy  [place]           [x] Phone: [phone]   Fax: [phone]  [place]           [] Phone: [phone]   Fax: [phone]        Physical Therapy Daily Treatment Note  Date:  2/10/2024    Patient Name:  Zaid Vargas    :  1970  MRN: [id number]  Restrictions/Precautions: Restrictions/Precautions: Fall Risk        [place] Creole Speaking using interpretor     Diagnosis:   Lumbago with sciatica, left side [M54.42]    Date of Injury/Surgery:   Treatment Diagnosis:     Insurance/Certification information:    Referring Physician:  Merlyn Roland MD     PCP: Jessica Fuentes MD  Next Doctor Visit:    Plan of care signed (Y/N):   no  Outcome Measure:  incomplete language barrier  Visit# / total visits:  3/  6-10  Pain level:  7 /10 currently    Goals:     Patient goals: lessen back pain with ADL , working as home health aid, walking, standing and lifting  Short term goals  Time Frame for Short term goals: 4 weeks  1. ind with HEP for core and hip strenght and flexibility  2. 505 less tenderness to palp in the lower lumbar fascia area  3. reports avg pain inthe back  4/10 or less with home health aid work and ALDs  4. demo good Ta contraction  Long Term Goals  Time Frame for Long Term Goals: 8 weeks  1. avg pain nthe lumbar area 2/10 with typicla activityi including home health aid work, ALDs, and wlaking and standing  2. lumbar flex and ext ROM WFL wiith no increase in her LBP above 2/10  3.  MMT 4+/5 B hip nad knees for greater ability to complete stairs and standing activity  4. pt with 75% less back pain and tightness overall and  with palpation             Summary of Evaluation:   Summary of Evaluation:  Assessment: Pt appears today [place]i Creole and will require . She has complaints of LBP that is chronic likely related to degeneration, muscle tightness and limited posture. she owen does not follow a directional preference.  She did have greater

## 2024-02-17 ENCOUNTER — HOSPITAL ENCOUNTER (OUTPATIENT)
Dept: PHYSICAL THERAPY | Age: 54
Setting detail: THERAPIES SERIES
Discharge: HOME | End: 2024-02-17
Payer: COMMERCIAL

## 2024-02-17 NOTE — FLOWSHEET NOTE
Physical Therapy  Cancellation/No-show Note  Patient Name:  Zaid Vargas  :  1970   Date:  2024  Cancelled visits to date: 1  No-shows to date: 0    For today's appointment patient:  [x]  Cancelled  []  Rescheduled appointment  [x]  No-show     Reason given by patient:  []  Patient ill  []  Conflicting appointment  []  No transportation    []  Conflict with work  []  No reason given  [x]  Other:     Comments:  Pt did not show up for this session, but there was a message on the answering machine that possibly was her?  Very faint - a few numbers murmured - not certain, but as she does not speak English - this may have been her trying to cancel her appointment?    Electronically signed by:  Sergio Whitmore II, PTA [id number]

## 2024-02-24 ENCOUNTER — HOSPITAL ENCOUNTER (OUTPATIENT)
Dept: PHYSICAL THERAPY | Age: 54
Setting detail: THERAPIES SERIES
Discharge: HOME | End: 2024-02-24
Payer: COMMERCIAL

## 2024-02-24 PROCEDURE — 97140 MANUAL THERAPY 1/> REGIONS: CPT

## 2024-02-24 PROCEDURE — 97110 THERAPEUTIC EXERCISES: CPT

## 2024-02-24 NOTE — FLOWSHEET NOTE
exercises.  Will continue with therapy as tolerated.   End pain 5/10 following manual/Hypervolt           Patient agrees with established plan of care and assisted in the development of their short term and long term goals. Patient had no adverse reaction with initial treatment and there are no barriers to learning. Demonstrates no mental or cognitive disorder.     Plan for Next Session: begin STM, MFR, and lumbar mobes as tolerated next session, possible IFC trail if pain very elevated.     Progress as tolerated  core and hip flexibility, stretching , core stability, and manual    Time In / Time Out:   0940/1020     Timed Code/Total Treatment Minutes: 40/40, (2) TE, (1) MT      Next Progress Note due:  2/27/24      Plan of Care Interventions:  [x] Therapeutic Exercise  [] Modalities:  [x] Therapeutic Activity     [] Ultrasound  [] Estim  [] Gait Training      [] Cervical Traction [] Lumbar Traction  [x] Neuromuscular Re-education    [] Cold/hotpack [] Iontophoresis   [x] Instruction in HEP      [] Vasopneumatic   [] Dry Needling    [x] Manual Therapy               [] Aquatic Therapy              Electronically signed by:  Elizabeth Dior PTA 2/24/2024, 9:40 AM     2/24/2024,9:40 AM

## 2024-03-02 ENCOUNTER — HOSPITAL ENCOUNTER (OUTPATIENT)
Dept: PHYSICAL THERAPY | Age: 54
Setting detail: THERAPIES SERIES
Discharge: HOME | End: 2024-03-02
Payer: COMMERCIAL

## 2024-03-02 PROCEDURE — 97110 THERAPEUTIC EXERCISES: CPT | Performed by: PHYSICAL THERAPY ASSISTANT

## 2024-03-02 NOTE — FLOWSHEET NOTE
(Pain Rating) Pt demonstrated overall fair tolerance to today's session. Continues to require verbal and tactile cueing for stabilization while performing exercises.    Will continue with therapy as tolerated.    End pain 4/10           Patient agrees with established plan of care and assisted in the development of their short term and long term goals. Patient had no adverse reaction with initial treatment and there are no barriers to learning. Demonstrates no mental or cognitive disorder.     Plan for Next Session: begin STM, MFR, and lumbar mobes as tolerated next session, possible IFC trail if pain very elevated.     Progress as tolerated  core and hip flexibility, stretching , core stability, and manual    Time In / Time Out:   0930/1020     Timed Code/Total Treatment Minutes: 50'/50'  (3) TE      Next Progress Note due:  2/27/24      Plan of Care Interventions:  [x] Therapeutic Exercise  [] Modalities:  [x] Therapeutic Activity     [] Ultrasound  [] Estim  [] Gait Training      [] Cervical Traction [] Lumbar Traction  [x] Neuromuscular Re-education    [] Cold/hotpack [] Iontophoresis   [x] Instruction in HEP      [] Vasopneumatic   [] Dry Needling    [x] Manual Therapy               [] Aquatic Therapy              Electronically signed by:  Olivia Chavez PTA 3/2/2024, 9:47 AM     3/2/2024,9:47 AM

## 2024-03-09 ENCOUNTER — HOSPITAL ENCOUNTER (OUTPATIENT)
Dept: PHYSICAL THERAPY | Age: 54
Setting detail: THERAPIES SERIES
Discharge: HOME | End: 2024-03-09
Payer: COMMERCIAL

## 2024-03-09 PROCEDURE — 97110 THERAPEUTIC EXERCISES: CPT

## 2024-03-09 NOTE — FLOWSHEET NOTE
with-band  -      -      -    MODALITIES  -      -      -        Other Therapeutic Activities/Education:  Patient received education on their current pathology and how their condition effects them with their functional activities. Patient understood discussion and questions were answered. Patient understands their activity limitations and understands rational for treatment progression.       Home Exercise Program:  Pt completed 1 set of HEP in clinic with instruction per HEP sheet dated today. Appeared to understand program. Any questions clarified.      Access Code: [email or web address]  URL: [email or web address]  Date: 01/27/2024  Prepared by: Js Ragland    Exercises  - Supine Lower Trunk Rotation  - 2 x daily - 7 x weekly - 1 sets - 10 reps  - Supine Posterior Pelvic Tilt  - 2 x daily - 7 x weekly - 1 sets - 10 reps  - Seated Hamstring Stretch  - 1 x daily - 7 x weekly - 1 sets - 3 reps  - Standing Hip Extension  - 1 x daily - 7 x weekly - 1 sets - 10 reps    Manual Treatments:  Hamstring stretching, hypervolt massage gun to B LB, glutes, hamstrings and quads (more gentle today) x5'    Modalities:   none    Communication with other providers:  cert sent to Merlyn Roland MD 1/27/24      Assessment:  Pt demonstrated overall fair tolerance to today's session. She had increased pain following the last session therefore reduced intensity of her session today. Continues to require verbal and tactile cueing for stabilization while performing exercises. Will continue with therapy as tolerated.    End pain 4/10     Patient agrees with established plan of care and assisted in the development of their short term and long term goals. Patient had no adverse reaction with initial treatment and there are no barriers to learning. Demonstrates no mental or cognitive disorder.     Plan for Next Session: begin STM, MFR, and lumbar mobes as tolerated next session, possible IFC trail if pain very elevated.     Progress as

## 2024-03-23 ENCOUNTER — HOSPITAL ENCOUNTER (OUTPATIENT)
Dept: PHYSICAL THERAPY | Age: 54
Setting detail: THERAPIES SERIES
Discharge: HOME | End: 2024-03-23
Payer: COMMERCIAL

## 2024-03-23 NOTE — FLOWSHEET NOTE
Physical Therapy  Cancellation/No-show Note  Patient Name:  Zaid Vargas  :  1970   Date:  3/23/2024  Cancelled visits to date: 1  No-shows to date: 1    For today's appointment patient:  []  Cancelled  []  Rescheduled appointment  [x]  No-show     Reason given by patient:  []  Patient ill  []  Conflicting appointment  []  No transportation    []  Conflict with work  [x]  No reason given  []  Other:     Comments:     Electronically signed by:  Sergio Whitmore II, PTA [id number]

## 2024-08-22 ENCOUNTER — HOSPITAL ENCOUNTER (OUTPATIENT)
Dept: PHYSICAL THERAPY | Age: 54
Setting detail: THERAPIES SERIES
Discharge: HOME | End: 2024-08-22
Payer: COMMERCIAL

## 2024-08-22 PROCEDURE — 97162 PT EVAL MOD COMPLEX 30 MIN: CPT

## 2024-08-22 PROCEDURE — 97110 THERAPEUTIC EXERCISES: CPT

## 2024-08-22 ASSESSMENT — PAIN DESCRIPTION - ORIENTATION: ORIENTATION: LEFT

## 2024-08-22 ASSESSMENT — PAIN DESCRIPTION - PAIN TYPE: TYPE: CHRONIC PAIN

## 2024-08-22 ASSESSMENT — PAIN DESCRIPTION - LOCATION: LOCATION: BACK

## 2024-08-22 ASSESSMENT — PAIN SCALES - GENERAL: PAINLEVEL_OUTOF10: 7

## 2024-08-22 ASSESSMENT — PAIN DESCRIPTION - DESCRIPTORS: DESCRIPTORS: SHARP

## 2024-08-22 NOTE — PROGRESS NOTES
Physical Therapy: Initial Evaluation    Patient: Zaid Vargas (53 y.o. female)   Examination Date: 2024  Plan of Care Certification Period: 2024 to        :  1970 ;    Confirmed: Yes MRN: [id number]  CSN: [id number]   Insurance: Payor: [employer] [place] / Plan: [employer] [place] / Product Type: *No Product type* /   Insurance ID: [id number] - (Medicaid Managed) Secondary Insurance (if applicable):    Referring Physician: Jamila Mckeon APRN - CNP     PCP: Merlyn Roland MD Visits to Date/Visits Approved:   /      No Show/Cancelled Appts:   /       Medical Diagnosis: Lumbago with sciatica, left side [M54.42]    Treatment Diagnosis: LBP, flexibility restrictions, poor ROM, global weakness.     PERTINENT MEDICAL HISTORY   Patient Assessed for Rehabilitation Services: Yes  Self reported health status:: Good    Medical History: Chart Reviewed: Yes   Past Medical History:   Diagnosis Date    Arthritis     Asthma     Dyspareunia in female     Fibroids     Hypertension     Hypothyroidism     Lower back pain     Obesity     Pelvic pain     Pelvic pain     Seasonal allergies      Surgical History:   Past Surgical History:   Procedure Laterality Date     SECTION      COLONOSCOPY      ESOPHAGOGASTRODUODENOSCOPY      UTERINE FIBROID SURGERY  2023       Medications:   Current Outpatient Medications:     montelukast (SINGULAIR) 10 MG tablet, Take 1 tablet by mouth nightly, Disp: , Rfl:     hydroCHLOROthiazide (HYDRODIURIL) 25 MG tablet, Take 1 tablet by mouth daily, Disp: , Rfl:     amLODIPine (NORVASC) 5 MG tablet, Take 1 tablet by mouth daily, Disp: , Rfl:     ibuprofen (ADVIL;MOTRIN) 800 MG tablet, Take 1 tablet by mouth every 6 hours as needed for Pain, Disp: , Rfl:     albuterol (ACCUNEB) 0.63 MG/3ML nebulizer solution, Take 3 mLs by nebulization every 6 hours as needed for Wheezing, Disp: , Rfl:   Allergies: Hydrocortisone and Other      SUBJECTIVE EXAMINATION

## 2024-08-22 NOTE — PLAN OF CARE
[facility]  [facility] [place] [facility]  [address]  [facility] [address]  Dept: [phone]  Dept Fax: [phone]  Loc: [phone]    PHYSICAL THERAPY PLAN OF CARE: INITIAL EVALUATION    Patient: Zaid Vargas (53 y.o. female)   Examination Date: 2024  Plan of Care Certification Period: 2024 to        :  1970  MRN: [id number]  CSN: [id number]   Insurance: Payor: [employer] / Plan: [employer] / Product Type: *No Product type* /   Insurance ID: [id number] - (Medicaid Managed) Secondary Insurance (if applicable):    Referring Physician: Jamila Mckeon APRN - CNP     PCP: Merlyn Roland MD Visits to Date/Visits Approved:   /      No Show/Cancelled Appts:   /       Medical Diagnosis: Lumbago with sciatica, left side [M54.42]    Treatment Diagnosis: LBP, flexibility restrictions, poor ROM, global weakness.       ASSESSMENT     Impression:  Pt is 53 year old female with 3 month onset of low back pain. Pt now has difficulties completing ADLs, general mobility, transfers and work duties . Pt demo deficits this date that include reduced lumbar ROM, core/LE weakness, pain and difficulties with transfers. Pt will benefit with PT services with progression of strength/ROM, manual and modalities to return to PLOF. Pt prior to onset of current condition had min/no pain with able to complete full ADLs and work activities. Patient received education on their current pathology and how their condition effects them with their functional activities. Patient understood discussion and questions were answered. Patient understands their activity limitations and understands rational for treatment progression.      Body Structures, Functions, Activity Limitations Requiring Skilled Therapeutic Intervention: Decreased functional mobility , Decreased ROM, Decreased strength, Decreased endurance    Statement of Medical Necessity: Physical Therapy

## 2024-08-22 NOTE — FLOWSHEET NOTE
min/no pain with able to complete full ADLs and work activities. Patient received education on their current pathology and how their condition effects them with their functional activities. Patient understood discussion and questions were answered. Patient understands their activity limitations and understands rational for treatment progression.          Subjective:  See eval         Any changes in Ambulatory Summary Sheet?  None        Objective:  See eval           Exercises: (No more than 4 columns)   Creole Speaking,  needed.   Exercise/Equipment 8/22/24  #1  Date Date           WARM UP         Nu step             TABLE      *sitting lumbar flexion stretch  4x15\"     *sitting hamstring stretch  3x12'      PPT      LTR       Iso hip ADD        Hooklying marches                         STANDING      *lumbar ext to slight past neutral with UE supported on table  10x  3\"                                              PROPRIOCEPTION                                    MODALITIES                      Other Therapeutic Activities/Education:  Patient received education on their current pathology and how their condition effects them with their functional activities. Patient understood discussion and questions were answered. Patient understands their activity limitations and understands rational for treatment progression.       Home Exercise Program: *HO issued, reviewed and discussed with patient. All questions answered. Pt agreed to comply.         Manual Treatments:        Modalities:  MHP and/or TENS ??      Communication with other providers:  POC sent 8/22/24        Assessment:  (Response towards treatment session) (Pain Rating)     Pt is 53 year old female with 3 month onset of low back pain. Pt now has difficulties completing ADLs, general mobility, transfers and work duties . Pt demo deficits this date that include reduced lumbar ROM, core/LE weakness, pain and difficulties with transfers. Pt will benefit

## 2024-08-30 ENCOUNTER — HOSPITAL ENCOUNTER (OUTPATIENT)
Dept: PHYSICAL THERAPY | Age: 54
Setting detail: THERAPIES SERIES
Discharge: HOME | End: 2024-08-30
Payer: COMMERCIAL

## 2024-08-30 PROCEDURE — G0283 ELEC STIM OTHER THAN WOUND: HCPCS

## 2024-08-30 PROCEDURE — 97140 MANUAL THERAPY 1/> REGIONS: CPT

## 2024-08-30 PROCEDURE — 97110 THERAPEUTIC EXERCISES: CPT

## 2024-08-30 NOTE — FLOWSHEET NOTE
Outpatient Physical Therapy  [place]           [x] Phone: [phone]   Fax: [phone]  [place]           [] Phone: [phone]   Fax: [phone]        Physical Therapy Daily Treatment Note  Date:  2024    Patient Name:  Zaid Vargas    :  1970  MRN: [id number]  Restrictions/Precautions: Restrictions/Precautions: Fall Risk   Creole Speaking,  needed.    High irritability      Diagnosis:   LBP  Date of Injury/Surgery:   Treatment Diagnosis:  LBP, flexibility restrictions, poor ROM, global weakness.  Insurance/Certification information:  [employer]  [id number]  Referring Physician:  Jamila Mckeon APRN *     PCP: Jessica Fuentes MD  Next Doctor Visit:    Plan of care signed (Y/N):  N, sent 24    Outcome Measure: Oswestry: --  Visit# / total visits:   2/6 per POC  Pain level: 8 /10   Goals:     Patient goals: improvement in pain.  Short term goals  Time Frame for Short term goals: Defer to Long Term Goals                   Long Term Goals Completed by 6 weeks 10/7/24       Long Term Goals: 6 weeks   Long Term Goal 1: Pt will demo I with HEP/symptom management.   Long Term Goal 2: Pt will demo normal standing  posture  with min/no deficits to ease community mobility.   Long Term Goal 3: Pt will demo supine to sit with min/no increase in pain to ease transfers.   Long Term Goal 4: Pt will complete >4+/5 core/LE strength to demo improved mobility.   Long Term Goal 5: Pt will demo >25% improvement in lumbar ROM to demo improved ease with ADLs.         Summary of Evaluation:    Pt is 53 year old female with 3 month onset of low back pain. Pt now has difficulties completing ADLs, general mobility, transfers and work duties . Pt demo deficits this date that include reduced lumbar ROM, core/LE weakness, pain and difficulties with transfers. Pt will benefit with PT services with progression of strength/ROM, manual and modalities to return to PLOF. Pt prior to onset of

## 2024-09-07 ENCOUNTER — HOSPITAL ENCOUNTER (OUTPATIENT)
Dept: PHYSICAL THERAPY | Age: 54
Setting detail: THERAPIES SERIES
Discharge: HOME | End: 2024-09-07
Payer: COMMERCIAL

## 2024-09-07 PROCEDURE — G0283 ELEC STIM OTHER THAN WOUND: HCPCS | Performed by: PHYSICAL THERAPY ASSISTANT

## 2024-09-07 PROCEDURE — 97140 MANUAL THERAPY 1/> REGIONS: CPT | Performed by: PHYSICAL THERAPY ASSISTANT

## 2024-09-07 PROCEDURE — 97110 THERAPEUTIC EXERCISES: CPT | Performed by: PHYSICAL THERAPY ASSISTANT

## 2024-09-07 NOTE — FLOWSHEET NOTE
current condition had min/no pain with able to complete full ADLs and work activities. Patient received education on their current pathology and how their condition effects them with their functional activities. Patient understood discussion and questions were answered. Patient understands their activity limitations and understands rational for treatment progression.          Subjective:  5/10 pain.  The pain is located directly in the middle of the low back.  The pt related the pain to the colder weather.here.        Any changes in Ambulatory Summary Sheet?  None        Objective:      Grimace with limited PPT with increase in pain.   Tolerated all manual well.   Limited ROM and activation with exercises.         Exercises: (No more than 4 columns)   Creole Speaking,  needed.   Exercise/Equipment 8/22/24  #1  8/30/24   #2 9/7/24 #3           WARM UP         Nu step   Lv5  5'    Sci Fit Lv4, 5 min.         TABLE      *sitting lumbar flexion stretch  4x15\" Discussed     *sitting hamstring stretch  3x12'  Discussed     PPT  10x2 3\"  10x2 3\"    DKC with swiss ball   10x2 3\"    LTR  10x2  3\"  10x2 3\" w/swiss ball    Iso hip ADD    10x2  2\"    Hooklying marches    1x8   HL with SLR    1x8 each side               STANDING      *lumbar ext to slight past neutral with UE supported on table  10x  3\" Discussed                                              PROPRIOCEPTION                                    MODALITIES      MHP    10' low back   EStim   IFC low back 10'       Other Therapeutic Activities/Education: --      Home Exercise Program: *HO issued, reviewed and discussed with patient. All questions answered. Pt agreed to comply.         Manual Treatments:  STM with hypervolt to low back in prone,  x10' .       Modalities:   Estim to low lumbar in prone with MHP , IFC , continuous, 15 volts x10' for pain management. No adverse effects.        Communication with other providers:  POC sent 8/22/24

## 2024-09-14 ENCOUNTER — HOSPITAL ENCOUNTER (OUTPATIENT)
Dept: PHYSICAL THERAPY | Age: 54
Setting detail: THERAPIES SERIES
Discharge: HOME | End: 2024-09-14
Payer: COMMERCIAL

## 2024-09-14 PROCEDURE — G0283 ELEC STIM OTHER THAN WOUND: HCPCS

## 2024-09-14 PROCEDURE — 97140 MANUAL THERAPY 1/> REGIONS: CPT

## 2024-09-14 PROCEDURE — 97110 THERAPEUTIC EXERCISES: CPT

## 2024-09-21 ENCOUNTER — HOSPITAL ENCOUNTER (OUTPATIENT)
Dept: PHYSICAL THERAPY | Age: 54
Setting detail: THERAPIES SERIES
Discharge: HOME | End: 2024-09-21
Payer: COMMERCIAL

## 2024-09-21 PROCEDURE — 97140 MANUAL THERAPY 1/> REGIONS: CPT

## 2024-09-21 PROCEDURE — G0283 ELEC STIM OTHER THAN WOUND: HCPCS

## 2024-09-21 PROCEDURE — 97110 THERAPEUTIC EXERCISES: CPT

## 2024-09-24 ENCOUNTER — OFFICE VISIT (OUTPATIENT)
Dept: OBGYN | Age: 54
End: 2024-09-24
Payer: COMMERCIAL

## 2024-09-24 VITALS
DIASTOLIC BLOOD PRESSURE: 85 MMHG | HEART RATE: 71 BPM | BODY MASS INDEX: 37.13 KG/M2 | SYSTOLIC BLOOD PRESSURE: 138 MMHG | HEIGHT: 68 IN | WEIGHT: 245 LBS

## 2024-09-24 DIAGNOSIS — R35.0 FREQUENT URINATION: ICD-10-CM

## 2024-09-24 DIAGNOSIS — N89.8 VAGINAL ITCHING: Primary | ICD-10-CM

## 2024-09-24 DIAGNOSIS — R10.2 PELVIC PAIN: ICD-10-CM

## 2024-09-24 LAB
BILIRUBIN, POC: NORMAL
BLOOD URINE, POC: NORMAL
CLARITY, POC: NORMAL
COLOR, POC: NORMAL
GLUCOSE URINE, POC: NORMAL MG/DL
KETONES, POC: NORMAL
LEUKOCYTE EST, POC: NORMAL
NITRITE, POC: NORMAL
PH, POC: NORMAL
PROTEIN, POC: NORMAL MG/DL
SPECIFIC GRAVITY, POC: NORMAL
UROBILINOGEN, POC: NORMAL

## 2024-09-24 PROCEDURE — 99459 PELVIC EXAMINATION: CPT

## 2024-09-24 PROCEDURE — 99213 OFFICE O/P EST LOW 20 MIN: CPT

## 2024-09-24 PROCEDURE — 81002 URINALYSIS NONAUTO W/O SCOPE: CPT

## 2024-09-24 ASSESSMENT — ENCOUNTER SYMPTOMS
NAUSEA: 0
ABDOMINAL PAIN: 0
CONSTIPATION: 0
SHORTNESS OF BREATH: 0
VOMITING: 0
CHEST TIGHTNESS: 0
GASTROINTESTINAL NEGATIVE: 1
RESPIRATORY NEGATIVE: 1
DIARRHEA: 0

## 2024-09-24 ASSESSMENT — PATIENT HEALTH QUESTIONNAIRE - PHQ9
1. LITTLE INTEREST OR PLEASURE IN DOING THINGS: NOT AT ALL
SUM OF ALL RESPONSES TO PHQ QUESTIONS 1-9: 0
SUM OF ALL RESPONSES TO PHQ9 QUESTIONS 1 & 2: 0
SUM OF ALL RESPONSES TO PHQ QUESTIONS 1-9: 0
2. FEELING DOWN, DEPRESSED OR HOPELESS: NOT AT ALL

## 2024-09-25 LAB
C TRACH DNA CVX QL NAA+PROBE: NEGATIVE
CANDIDA DNA VAG QL NAA+PROBE: NORMAL
G VAGINALIS DNA SPEC QL NAA+PROBE: NORMAL
N GONORRHOEA DNA CERV MUCUS QL NAA+PROBE: NEGATIVE
T VAGINALIS DNA VAG QL NAA+PROBE: NORMAL

## 2024-09-26 LAB — BACTERIA UR CULT: NORMAL

## 2024-09-27 ENCOUNTER — HOSPITAL ENCOUNTER (OUTPATIENT)
Dept: PHYSICAL THERAPY | Age: 54
Setting detail: THERAPIES SERIES
Discharge: HOME | End: 2024-09-27
Payer: COMMERCIAL

## 2024-09-27 PROCEDURE — 97110 THERAPEUTIC EXERCISES: CPT

## 2024-09-27 PROCEDURE — 97140 MANUAL THERAPY 1/> REGIONS: CPT

## 2024-10-03 ENCOUNTER — OFFICE VISIT (OUTPATIENT)
Dept: OBGYN | Age: 54
End: 2024-10-03
Payer: COMMERCIAL

## 2024-10-03 DIAGNOSIS — R35.0 URINARY FREQUENCY: ICD-10-CM

## 2024-10-03 DIAGNOSIS — N89.8 VAGINAL DRYNESS: ICD-10-CM

## 2024-10-03 DIAGNOSIS — N89.8 VAGINAL ITCHING: Primary | ICD-10-CM

## 2024-10-03 PROCEDURE — 99213 OFFICE O/P EST LOW 20 MIN: CPT

## 2024-10-03 RX ORDER — OXYBUTYNIN CHLORIDE 5 MG/1
5 TABLET, EXTENDED RELEASE ORAL DAILY
Qty: 30 TABLET | Refills: 2 | Status: SHIPPED | OUTPATIENT
Start: 2024-10-03 | End: 2024-10-03 | Stop reason: CLARIF

## 2024-10-04 ASSESSMENT — ENCOUNTER SYMPTOMS
ABDOMINAL PAIN: 0
CONSTIPATION: 0
NAUSEA: 0
GASTROINTESTINAL NEGATIVE: 1
CHEST TIGHTNESS: 0
DIARRHEA: 0
VOMITING: 0
RESPIRATORY NEGATIVE: 1
SHORTNESS OF BREATH: 0

## 2025-07-28 ENCOUNTER — HOSPITAL ENCOUNTER (OUTPATIENT)
Age: 55
Setting detail: SPECIMEN
Discharge: HOME | End: 2025-07-28

## 2025-07-28 ENCOUNTER — OFFICE VISIT (OUTPATIENT)
Dept: OBGYN | Age: 55
End: 2025-07-28

## 2025-07-28 VITALS — SYSTOLIC BLOOD PRESSURE: 133 MMHG | WEIGHT: 249 LBS | BODY MASS INDEX: 37.86 KG/M2 | DIASTOLIC BLOOD PRESSURE: 95 MMHG

## 2025-07-28 DIAGNOSIS — R10.2 PELVIC PAIN: ICD-10-CM

## 2025-07-28 DIAGNOSIS — Z12.31 SCREENING MAMMOGRAM FOR BREAST CANCER: ICD-10-CM

## 2025-07-28 DIAGNOSIS — N89.8 VAGINAL DISCHARGE: ICD-10-CM

## 2025-07-28 DIAGNOSIS — Z01.419 ENCOUNTER FOR WELL WOMAN EXAM WITH ROUTINE GYNECOLOGICAL EXAM: Primary | ICD-10-CM

## 2025-07-28 PROCEDURE — G0123 SCREEN CERV/VAG THIN LAYER: HCPCS

## 2025-07-28 PROCEDURE — 87624 HPV HI-RISK TYP POOLED RSLT: CPT

## 2025-07-28 PROCEDURE — 99396 PREV VISIT EST AGE 40-64: CPT | Performed by: OBSTETRICS & GYNECOLOGY

## 2025-07-28 SDOH — ECONOMIC STABILITY: FOOD INSECURITY: WITHIN THE PAST 12 MONTHS, THE FOOD YOU BOUGHT JUST DIDN'T LAST AND YOU DIDN'T HAVE MONEY TO GET MORE.: NEVER TRUE

## 2025-07-28 SDOH — ECONOMIC STABILITY: FOOD INSECURITY: WITHIN THE PAST 12 MONTHS, YOU WORRIED THAT YOUR FOOD WOULD RUN OUT BEFORE YOU GOT MONEY TO BUY MORE.: NEVER TRUE

## 2025-07-28 ASSESSMENT — PATIENT HEALTH QUESTIONNAIRE - PHQ9
SUM OF ALL RESPONSES TO PHQ QUESTIONS 1-9: 0
SUM OF ALL RESPONSES TO PHQ QUESTIONS 1-9: 0
2. FEELING DOWN, DEPRESSED OR HOPELESS: NOT AT ALL
SUM OF ALL RESPONSES TO PHQ QUESTIONS 1-9: 0
1. LITTLE INTEREST OR PLEASURE IN DOING THINGS: NOT AT ALL
SUM OF ALL RESPONSES TO PHQ QUESTIONS 1-9: 0

## 2025-07-28 NOTE — PROGRESS NOTES
25    Zaid Vargas  1970    Chief Complaint   Patient presents with    Gynecologic Exam     Annual exam. Non-smoker. No known h/o dvt. Patient is postmenopausal is not on hrt . Patient is sexually active. Pap Smear was  Date of last Cervical Cancer screen (HPV or PAP): 2023  and results were negative+ hpv. Patient had a recent mammogram  which was negative for malignancy. Patient has not had previous bone density scan. Colonoscopy  normal .  Patient complains of vaginal itching denies discharge or odor.         Zaid Vargas is a 54 y.o. female who presents today for evaluation of see above.    Past Medical History:   Diagnosis Date    Arthritis     Asthma     Dyspareunia in female     Fibroids     Hypertension     Hypothyroidism     Lower back pain     Obesity     Pelvic pain     Pelvic pain     Seasonal allergies        Past Surgical History:   Procedure Laterality Date     SECTION      COLONOSCOPY      ESOPHAGOGASTRODUODENOSCOPY      UTERINE FIBROID SURGERY  2023       Social History     Tobacco Use    Smoking status: Never    Smokeless tobacco: Never   Vaping Use    Vaping status: Never Used   Substance Use Topics    Alcohol use: Never    Drug use: Never       Family History   Problem Relation Age of Onset    Arthritis Mother     Breast Cancer Neg Hx     Ovarian Cancer Neg Hx        Current Outpatient Medications   Medication Sig Dispense Refill    montelukast (SINGULAIR) 10 MG tablet Take 1 tablet by mouth nightly      hydroCHLOROthiazide (HYDRODIURIL) 25 MG tablet Take 1 tablet by mouth daily      amLODIPine (NORVASC) 5 MG tablet Take 1 tablet by mouth daily      ibuprofen (ADVIL;MOTRIN) 800 MG tablet Take 1 tablet by mouth every 6 hours as needed for Pain      albuterol (ACCUNEB) 0.63 MG/3ML nebulizer solution Take 3 mLs by nebulization every 6 hours as needed for Wheezing       No current facility-administered medications for this visit.       Allergies   Allergen

## 2025-07-29 LAB
C TRACH DNA SPEC QL NAA+PROBE: NOT DETECTED
CANDIDA RRNA VAG QL PROBE: NOT DETECTED
CANDIDA RRNA VAG QL PROBE: NOT DETECTED
CARBAPENEM RESISTANCE OXA-48 GENE BY PCR: NOT DETECTED
CEPHALOSPORIN RESISTANCE AMPC GENE: NOT DETECTED
ESBL RESISTANCE: NOT DETECTED
G VAGINALIS RRNA GENITAL QL PROBE: NOT DETECTED
M HOMINIS DNA BLD QL NAA+PROBE: NOT DETECTED
MACROLIDE RESISTANCE: NOT DETECTED
METHICILLIN RESISTANCE: NOT DETECTED
MYCOPLASMA DNA SPEC QL NAA+PROBE: NOT DETECTED
N GONORRHOEA DNA SPEC QL NAA+PROBE: NOT DETECTED
OTHER MICROORG DNA SPEC QL NAA+PROBE: DETECTED
OTHER MICROORG DNA SPEC QL NAA+PROBE: NOT DETECTED
QUINOLONE AND FLUOROQUINOLONE RESISTANCE: NOT DETECTED
T VAGINALIS RRNA SPEC QL NAA+PROBE: NOT DETECTED
TETRACYCLINE RESISTANCE: NOT DETECTED
TRIMETHOPRIM/SULFONAMIDE RESISTANCE: NOT DETECTED

## 2025-07-30 LAB
COMMENT: NORMAL
HPV OTHER HR TYPES: NORMAL
HPV TYPE 16: NORMAL
HPV TYPE 18: NORMAL

## 2025-08-05 LAB
COMMENT: ABNORMAL
HPV OTHER HR TYPES: DETECTED
HPV TYPE 16: NOT DETECTED
HPV TYPE 18: NOT DETECTED

## 2025-08-11 LAB — GYNECOLOGY CYTOLOGY REPORT: NORMAL
